# Patient Record
Sex: FEMALE | Race: WHITE | NOT HISPANIC OR LATINO | Employment: PART TIME | URBAN - METROPOLITAN AREA
[De-identification: names, ages, dates, MRNs, and addresses within clinical notes are randomized per-mention and may not be internally consistent; named-entity substitution may affect disease eponyms.]

---

## 2017-12-30 PROBLEM — M16.11 PRIMARY OSTEOARTHRITIS OF RIGHT HIP: Status: ACTIVE | Noted: 2017-12-30

## 2018-04-03 PROBLEM — M16.11 PRIMARY OSTEOARTHRITIS OF RIGHT HIP: Status: RESOLVED | Noted: 2017-12-30 | Resolved: 2018-04-03

## 2023-08-22 PROBLEM — S82.032A CLOSED DISPLACED TRANSVERSE FRACTURE OF LEFT PATELLA: Status: ACTIVE | Noted: 2023-08-22

## 2023-10-16 PROBLEM — T84.84XA PAINFUL ORTHOPAEDIC HARDWARE: Status: ACTIVE | Noted: 2023-10-16

## 2024-04-08 ENCOUNTER — OFFICE VISIT (OUTPATIENT)
Dept: ORTHOPEDICS | Facility: CLINIC | Age: 64
End: 2024-04-08
Payer: COMMERCIAL

## 2024-04-08 ENCOUNTER — TELEPHONE (OUTPATIENT)
Dept: ORTHOPEDICS | Facility: CLINIC | Age: 64
End: 2024-04-08

## 2024-04-08 VITALS — HEIGHT: 62 IN | BODY MASS INDEX: 29.25 KG/M2 | WEIGHT: 158.94 LBS

## 2024-04-08 DIAGNOSIS — L98.9 SKIN LESION OF HAND: Primary | ICD-10-CM

## 2024-04-08 PROCEDURE — 3008F BODY MASS INDEX DOCD: CPT | Mod: CPTII,S$GLB,, | Performed by: ORTHOPAEDIC SURGERY

## 2024-04-08 PROCEDURE — 99999 PR PBB SHADOW E&M-EST. PATIENT-LVL III: CPT | Mod: PBBFAC,,, | Performed by: ORTHOPAEDIC SURGERY

## 2024-04-08 PROCEDURE — 99204 OFFICE O/P NEW MOD 45 MIN: CPT | Mod: S$GLB,,, | Performed by: ORTHOPAEDIC SURGERY

## 2024-04-08 PROCEDURE — 1159F MED LIST DOCD IN RCRD: CPT | Mod: CPTII,S$GLB,, | Performed by: ORTHOPAEDIC SURGERY

## 2024-04-08 NOTE — PROGRESS NOTES
"  2024    Chief Complaint:  No chief complaint on file.      HPI:  Zahira Méndez is a 64 y.o. female, who presents to clinic today has a history of mass over the dorsum of her right.  It has only been there for a short time.  It is getting bigger in his causing soreness.  She is here today for evaluation and treatment.      PMHX:  Past Medical History:   Diagnosis Date    1 PPD X 10+ YR Tobacco Abuse 2018    Attention Deficit Disorder 2018    Depression 2018    Family H/O CAD a    "SEs q5Yr;" Her Father And Brother    General Anesthetics Causing Adverse Effect 2018    wakes up very slowly    H/O 2 Jaw Surgeries 2018    H/O Right THR In 2018    Dr. Jesus Astorga    Hypercholesterolemia 2018    She Was RXd Crestor 10 Mg qHS In The Past    Lumbar Spinal DDD 2018 L-Spine MRI W/O Contrast = (Report In CPS)    Mild Hypertriglyceridemia 2018    PONV (postoperative nausea and vomiting)     Strong Family H/O DM 2018    Therapeutic Drug Monitoring 2018       PSHX:  Past Surgical History:   Procedure Laterality Date     SECTION      x 2    HYSTERECTOMY      JOINT REPLACEMENT Right 2017    GRUPO    MANDIBLE OSTEOTOMY      OPEN REDUCTION AND INTERNAL FIXATION (ORIF) OF FRACTURE OF PATELLA Left 2023    Procedure: ORIF, FRACTURE, PATELLA;  Surgeon: Jesus Astorga MD;  Location: [facility] OR;  Service: Orthopedics;  Laterality: Left;    REMOVAL OF HARDWARE FROM LOWER EXTREMITY Left 10/16/2023    Procedure: REMOVAL, HARDWARE, LOWER EXTREMITY- patella;  Surgeon: Jesus Astorga MD;  Location: [facility] OR;  Service: Orthopedics;  Laterality: Left;    TEMPOROMANDIBULAR JOINT SURGERY      TONSILLECTOMY         FMHX:  Family History   Problem Relation Age of Onset    Arthritis Mother     Stroke Mother     Heart disease Father     No Known Problems Sister     Heart disease Brother        SOCHX:  Social History     Tobacco Use    Smoking " "status: Every Day     Current packs/day: 1.00     Average packs/day: 1 pack/day for 10.0 years (10.0 ttl pk-yrs)     Types: Cigarettes    Smokeless tobacco: Never   Substance Use Topics    Alcohol use: No       ALLERGIES:  Morphine and Cyclobenzaprine    CURRENT MEDICATIONS:  Current Outpatient Medications on File Prior to Visit   Medication Sig Dispense Refill    cholecalciferol, vitamin D3, 1,250 mcg (50,000 unit) capsule Take 50,000 Units by mouth every 7 days.      dextroamphetamine-amphetamine (ADDERALL XR) 20 MG 24 hr capsule Take 20 mg by mouth 2 (two) times daily.       escitalopram oxalate (LEXAPRO) 20 MG tablet Take 20 mg by mouth every evening.      buPROPion (WELLBUTRIN SR) 100 MG TBSR 12 hr tablet  (Patient not taking: Reported on 4/8/2024)       No current facility-administered medications on file prior to visit.       REVIEW OF SYSTEMS:  Review of Systems   Constitutional: Negative.    HENT: Negative.     Eyes: Negative.    Respiratory: Negative.     Cardiovascular: Negative.    Gastrointestinal: Negative.    Genitourinary: Negative.    Musculoskeletal:  Positive for joint pain.   Skin: Negative.    Neurological: Negative.    Endo/Heme/Allergies: Negative.    Psychiatric/Behavioral: Negative.       GENERAL PHYSICAL EXAM:   Ht 5' 2" (1.575 m)   Wt 72.1 kg (158 lb 15.2 oz)   BMI 29.07 kg/m²    GEN: well developed, well nourished, no acute distress   HENT: Normocephalic, atraumatic   EYES: No discharge, conjunctiva normal   NECK: Supple, non-tender   PULM: No wheezing, no respiratory distress   CV: RRR   ABD: Soft, non-tender    ORTHO EXAM:   Examination of the right hand reveals that there is a mass which measures 0.5 x 1 cm in size overlying the dorsum of the hand.  This is a cutaneous type mass.  There was a small heaped up margin.  There was a small central ulceration.  This mass is mobile with the skin.  It was not tethered to the underlying tissues.  It is mildly tender to palpation.  There is no " surrounding erythema or drainage.  He is able to flex and extend the fingers.  He was grossly neurovascularly intact.    RADIOLOGY:   None    ASSESSMENT:   Right hand cutaneous mass    PLAN:  1. Will send the patient to dermatology for possible biopsy of this lesion     2. If this does appear to be something that needs resection or excision be happy to perform that procedure for her.

## 2024-04-08 NOTE — TELEPHONE ENCOUNTER
----- Message from Abel Yao MA sent at 4/8/2024  2:24 PM CDT -----  Contact: patient  Patient was seen earlier today & Dr. Armstrong recommended she see a dermatologist.  Cannot get in until June.  Patient stated she was told her second option was for Dr. Armstrong to do it?    Call back number is [phone]

## 2024-04-08 NOTE — TELEPHONE ENCOUNTER
Called and spoke with patient regarding her going to Dermatology.  I stated that our supervisor is working on getting her a sooner appointment and they will call her asap.

## 2024-04-11 ENCOUNTER — OFFICE VISIT (OUTPATIENT)
Dept: DERMATOLOGY | Facility: CLINIC | Age: 64
End: 2024-04-11
Payer: COMMERCIAL

## 2024-04-11 DIAGNOSIS — D48.5 NEOPLASM OF UNCERTAIN BEHAVIOR OF SKIN: Primary | ICD-10-CM

## 2024-04-11 PROCEDURE — 88312 SPECIAL STAINS GROUP 1: CPT | Mod: PO | Performed by: DERMATOLOGY

## 2024-04-11 PROCEDURE — 99999 PR PBB SHADOW E&M-EST. PATIENT-LVL II: CPT | Mod: PBBFAC,,, | Performed by: DERMATOLOGY

## 2024-04-11 PROCEDURE — 88312 SPECIAL STAINS GROUP 1: CPT | Mod: 26,,, | Performed by: DERMATOLOGY

## 2024-04-11 PROCEDURE — 88305 TISSUE EXAM BY PATHOLOGIST: CPT | Mod: PO | Performed by: DERMATOLOGY

## 2024-04-11 PROCEDURE — 99499 UNLISTED E&M SERVICE: CPT | Mod: S$GLB,,, | Performed by: DERMATOLOGY

## 2024-04-11 PROCEDURE — 11102 TANGNTL BX SKIN SINGLE LES: CPT | Mod: S$GLB,,, | Performed by: DERMATOLOGY

## 2024-04-11 PROCEDURE — 88305 TISSUE EXAM BY PATHOLOGIST: CPT | Mod: 26,,, | Performed by: DERMATOLOGY

## 2024-04-11 NOTE — PROGRESS NOTES
Subjective:      Patient ID:  Zahira Méndez is a 64 y.o. female who presents for No chief complaint on file.    HPI  New Patient  Here for growing lesion on R hand x 3 months , per referral. Enlarging. Early after onset, turkey pecked /traumatized lesion. +Painful     No phx or fhx of skin cancer    Review of Systems    Objective:   Physical Exam   Constitutional: She appears well-developed and well-nourished. No distress.   Neurological: She is alert and oriented to person, place, and time. She is not disoriented.   Psychiatric: She has a normal mood and affect.   Skin:              Diagram Legend     Erythematous scaling macule/papule c/w actinic keratosis       Vascular papule c/w angioma      Pigmented verrucoid papule/plaque c/w seborrheic keratosis      Yellow umbilicated papule c/w sebaceous hyperplasia      Irregularly shaped tan macule c/w lentigo     1-2 mm smooth white papules consistent with Milia      Movable subcutaneous cyst with punctum c/w epidermal inclusion cyst      Subcutaneous movable cyst c/w pilar cyst      Firm pink to brown papule c/w dermatofibroma      Pedunculated fleshy papule(s) c/w skin tag(s)      Evenly pigmented macule c/w junctional nevus     Mildly variegated pigmented, slightly irregular-bordered macule c/w mildly atypical nevus      Flesh colored to evenly pigmented papule c/w intradermal nevus       Pink pearly papule/plaque c/w basal cell carcinoma      Erythematous hyperkeratotic cursted plaque c/w SCC      Surgical scar with no sign of skin cancer recurrence      Open and closed comedones      Inflammatory papules and pustules      Verrucoid papule consistent consistent with wart     Erythematous eczematous patches and plaques     Dystrophic onycholytic nail with subungual debris c/w onychomycosis     Umbilicated papule    Erythematous-base heme-crusted tan verrucoid plaque consistent with inflamed seborrheic keratosis     Erythematous Silvery Scaling Plaque c/w  Psoriasis     See annotation        Assessment / Plan:      Pathology Orders:       Normal Orders This Visit    Specimen to Pathology, Dermatology     Comments:    Number of Specimens:->1  Spec 1 Procedure:->Biopsy  Spec 1 Clinical Impression:->r/o scc r/o infectious  Spec 1 Source:->right dorsal hand  Release to patient->Immediate    Questions:    Procedure Type: Dermatology and skin neoplasms    Number of Specimens: 1    ------------------------:     Spec 1 Procedure: Biopsy    Spec 1 Clinical Impression: r/o scc r/o infectious    Spec 1 Source: right dorsal hand    Release to patient: Immediate          Neoplasm of uncertain behavior of skin  -     Ambulatory referral/consult to Dermatology  -     Specimen to Pathology, Dermatology    - Discussed diagnosis with patient and explained uncertain nature of condition, including differential DDX.   - Discussed treatment options (biopsy, close monitoring) with patient, including the risks and benefits of each. Patient opted to pursue biopsy.  - Shave Biopsy Procedure Note: Discussed procedure with patient/patient's guardian including risks and benefits as well as treatment alternatives. Risks of procedure include pain, bleeding, infection, post-inflammatory pigmentary alteration, scar, recurrence. Patient informed that the purpose of a biopsy is sampling of condition in question rather than removal in entirety; further treatment may be necessary. Verbal consent obtained. Area to be biopsied marked and cleansed with alcohol. Local anesthesia achieved by injecting approximately 1 cc of 1% lidocaine with epinephrine. One shave biopsy performed using a double edge razor blade; specimen submitted to pathology. Hemostasis achieved with aluminum chloride. Petroleum jelly and bandage applied to wound. Patient tolerated procedure well. After-visit wound care instructions reviewed and provided in writing.            Follow up for pending pathology.

## 2024-04-11 NOTE — PATIENT INSTRUCTIONS
Shave Biopsy Wound Care    Your doctor has performed a shave biopsy today.  A band aid and vaseline ointment has been placed over the site.  This should remain in place for NO LONGER THAN 48 hours.  It is fine to remove the bandaid after 24 hours, if the area is no longer bleeding. It is recommended that you keep the area dry (do not wet)) for the first 24 hours.  After 24 hours, wash the area with warm soap and water and apply Vaseline jelly.  Many patients prefer to use Neosporin or Bacitracin ointment.  This is acceptable; however, know that you can develop an allergy to this medication even if you have used it safely for years.  It is important to keep the area moist.  Letting it dry out and get air slows healing time, and will worsen the scar.        If you notice increasing redness, tenderness, pain, or yellow drainage at the biopsy site, please notify your doctor.  These are signs of an infection.    If your biopsy site is bleeding, apply firm pressure for 15 minutes straight.  Repeat for another 15 minutes, if it is still bleeding.   If the surgical site continues to bleed, then please contact your doctor.      For [facility] users:   You will receive your biopsy results in [facility] as soon as they are available. Please be assured that your physician/provider will review your results and will then determine what further treatment, evaluation, or planning is required. You should be contacted by your physician's/provider's office within 5 business days of receiving your results; If not, please reach out to directly. This is one more way [facility] is putting you first.     [facility] [place] [address]/ [phone] FAX/ [email or web address]

## 2024-04-17 LAB
FINAL PATHOLOGIC DIAGNOSIS: NORMAL
GROSS: NORMAL
Lab: NORMAL
MICROSCOPIC EXAM: NORMAL

## 2024-04-18 ENCOUNTER — TELEPHONE (OUTPATIENT)
Dept: DERMATOLOGY | Facility: CLINIC | Age: 64
End: 2024-04-18
Payer: COMMERCIAL

## 2024-04-18 NOTE — TELEPHONE ENCOUNTER
Attempted call to Ms Méndez to discuss pathology results.  Voicemail left to return call at her convenience.  Will update as indicated

## 2024-04-18 NOTE — TELEPHONE ENCOUNTER
----- Message from Dennise Trammell MD sent at 4/18/2024  5:21 AM CDT -----  1. Skin, right dorsal hand, shave biopsy:    - CONSISTENT WITH PRURIGO NODULARIS WITH PITYROSPORUM (see comment)   Comment:  The lesion is transected at the biopsy base.  Re-biopsy is recommended if the lesion persists or recurs.     Please call to discuss results / plan / schedule:   Biopsy overall benign, no evidence of cancer at this time. Most consistent with a 's nodule and some yeast (likely incidental). Recommend local wound care to site and strict avoidance of lesional manipulation. Recommend re-evaluation in 1-2 months. Will possibly re-biopsy at that time if lesion recurrent / not healing as expected post biopsy. Thank you.

## 2024-04-18 NOTE — PROGRESS NOTES
1. Skin, right dorsal hand, shave biopsy:    - CONSISTENT WITH PRURIGO NODULARIS WITH PITYROSPORUM (see comment)   Comment:  The lesion is transected at the biopsy base.  Re-biopsy is recommended if the lesion persists or recurs.     Please call to discuss results / plan / schedule:   Biopsy overall benign, no evidence of cancer at this time. Most consistent with a 's nodule and some yeast (likely incidental). Recommend local wound care to site and strict avoidance of lesional manipulation. Recommend re-evaluation in 1-2 months. Will possibly re-biopsy at that time if lesion recurrent / not healing as expected post biopsy. Thank you.

## 2024-04-23 ENCOUNTER — PATIENT MESSAGE (OUTPATIENT)
Dept: DERMATOLOGY | Facility: CLINIC | Age: 64
End: 2024-04-23
Payer: COMMERCIAL

## 2024-10-14 ENCOUNTER — OFFICE VISIT (OUTPATIENT)
Facility: CLINIC | Age: 64
End: 2024-10-14
Payer: COMMERCIAL

## 2024-10-14 DIAGNOSIS — L82.1 SK (SEBORRHEIC KERATOSIS): ICD-10-CM

## 2024-10-14 DIAGNOSIS — Z12.83 SCREENING FOR SKIN CANCER: ICD-10-CM

## 2024-10-14 DIAGNOSIS — D18.01 CHERRY ANGIOMA: ICD-10-CM

## 2024-10-14 DIAGNOSIS — D23.30 BLUE NEVUS OF FACE: ICD-10-CM

## 2024-10-14 DIAGNOSIS — D22.9 MULTIPLE BENIGN NEVI: Primary | ICD-10-CM

## 2024-10-14 DIAGNOSIS — L81.4 LENTIGINES: ICD-10-CM

## 2024-10-14 DIAGNOSIS — L57.0 AK (ACTINIC KERATOSIS): ICD-10-CM

## 2024-10-14 PROCEDURE — 17000 DESTRUCT PREMALG LESION: CPT | Mod: S$GLB,,, | Performed by: DERMATOLOGY

## 2024-10-14 PROCEDURE — 99213 OFFICE O/P EST LOW 20 MIN: CPT | Mod: 25,S$GLB,, | Performed by: DERMATOLOGY

## 2024-10-14 PROCEDURE — 17003 DESTRUCT PREMALG LES 2-14: CPT | Mod: S$GLB,,, | Performed by: DERMATOLOGY

## 2024-10-14 PROCEDURE — 99999 PR PBB SHADOW E&M-EST. PATIENT-LVL II: CPT | Mod: PBBFAC,,, | Performed by: DERMATOLOGY

## 2024-10-14 PROCEDURE — 1159F MED LIST DOCD IN RCRD: CPT | Mod: CPTII,S$GLB,, | Performed by: DERMATOLOGY

## 2024-10-14 RX ORDER — NICOTINE 7MG/24HR
1 PATCH, TRANSDERMAL 24 HOURS TRANSDERMAL
COMMUNITY
Start: 2024-09-10

## 2024-10-14 RX ORDER — IBUPROFEN 200 MG
1 TABLET ORAL
COMMUNITY
Start: 2024-07-30

## 2024-10-14 RX ORDER — DEXTROAMPHETAMINE SACCHARATE, AMPHETAMINE ASPARTATE, DEXTROAMPHETAMINE SULFATE AND AMPHETAMINE SULFATE 5; 5; 5; 5 MG/1; MG/1; MG/1; MG/1
1 TABLET ORAL 2 TIMES DAILY
COMMUNITY
Start: 2024-09-10

## 2024-10-14 NOTE — PROGRESS NOTES
Subjective:      Patient ID:  Zahira Méndez is a 64 y.o. female who presents for   Chief Complaint   Patient presents with    Follow-up     3 month follow up visit      Skin Check     Skin check      HPI  Established patient.  Here today for total body skin exam.   No personal or known family hx skin cancer.      4/2024  1. Skin, right dorsal hand, shave biopsy:    - CONSISTENT WITH PRURIGO NODULARIS WITH PITYROSPORUM (see comment)   Comment:  The lesion is transected at the biopsy base.  Re-biopsy is recommended if the lesion persists or recurs.     Review of Systems    Objective:   Physical Exam   Constitutional: She appears well-developed and well-nourished. No distress.   Neurological: She is alert and oriented to person, place, and time. She is not disoriented.   Psychiatric: She has a normal mood and affect.   Skin:   Areas Examined (abnormalities noted in diagram):   Scalp / Hair Palpated and Inspected  Head / Face Inspection Performed  Neck Inspection Performed  Chest / Axilla Inspection Performed  Abdomen Inspection Performed  Back Inspection Performed  RUE Inspected  LUE Inspection Performed  RLE Inspected  LLE Inspection Performed                 Diagram Legend     Erythematous scaling macule/papule c/w actinic keratosis       Vascular papule c/w angioma      Pigmented verrucoid papule/plaque c/w seborrheic keratosis      Yellow umbilicated papule c/w sebaceous hyperplasia      Irregularly shaped tan macule c/w lentigo     1-2 mm smooth white papules consistent with Milia      Movable subcutaneous cyst with punctum c/w epidermal inclusion cyst      Subcutaneous movable cyst c/w pilar cyst      Firm pink to brown papule c/w dermatofibroma      Pedunculated fleshy papule(s) c/w skin tag(s)      Evenly pigmented macule c/w junctional nevus     Mildly variegated pigmented, slightly irregular-bordered macule c/w mildly atypical nevus      Flesh colored to evenly pigmented papule c/w intradermal nevus        Pink pearly papule/plaque c/w basal cell carcinoma      Erythematous hyperkeratotic cursted plaque c/w SCC      Surgical scar with no sign of skin cancer recurrence      Open and closed comedones      Inflammatory papules and pustules      Verrucoid papule consistent consistent with wart     Erythematous eczematous patches and plaques     Dystrophic onycholytic nail with subungual debris c/w onychomycosis     Umbilicated papule    Erythematous-base heme-crusted tan verrucoid plaque consistent with inflamed seborrheic keratosis     Erythematous Silvery Scaling Plaque c/w Psoriasis     See annotation      Assessment / Plan:        AK (actinic keratosis)  - Discussed diagnosis, etiology, and precancerous nature of condition.   - Cryosurgery Procedure Note: Discussed procedure with patient/patient's guardian including risks and benefits as well as treatment alternatives. Risks of procedure include pain, itching, swelling, redness, blistering, crusting, wound formation, post-inflammatory pigmentary alteration, scar, recurrence. Verbal consent obtained. LN2 cryosurgery performed to 6 lesion(s). Patient tolerated procedure well. After-visit wound care instructions reviewed and provided in writing.      Blue nevus of face, favored  Plan to recheck in 3-6 months to ensure no atypical change.     Multiple benign nevi  - Discussed diagnosis, etiology, and benign-nature of condition.  - Reassured; no lesions suspicious for malignancy noted on exam today.   - Recommended routine self examination of skin. Discussed the ABCDEs of melanoma and ugly duckling sign.   - Recommended daily sun protection, including the use of OTC broad-spectrum sunscreen (SPF 30 or greater) and sun-protective clothing.      Lentigines  - Benign; reassured treatment not necessary.   - Recommended daily sun protection, including the use of OTC broad-spectrum sunscreen (SPF 30 or greater) and sun-protective clothing.       SK (seborrheic  keratosis)  Cherry angioma  - Benign; reassured treatment not necessary.      Screening for skin cancer  - Total body skin examination performed today.  - Findings listed above.   - Recommended routine self examination of skin.    - Recommended daily sun protection, including the use of OTC broad-spectrum sunscreen (SPF 30 or greater) and sun-protective clothing.             Follow up for spot recheck 3-6 months .

## 2024-10-16 NOTE — PATIENT INSTRUCTIONS
CRYOSURGERY      Your doctor has used a method called cryosurgery to treat your skin condition. Cryosurgery refers to the use of very cold substances to treat a variety of skin conditions such as warts, pre-skin cancers, molluscum contagiosum, sun spots, and several benign growths. The substance we use in cryosurgery is liquid nitrogen and is so cold (-195 degrees Celsius) that is burns when administered.     Following treatment in the office, the skin may immediately burn and become red. You may find the area around the lesion is affected as well. It is sometimes necessary to treat not only the lesion, but a small area of the surrounding normal skin to achieve a good response.     A blister, and even a blood filled blister, may form after treatment.   This is a normal response. If the blister is painful, it is acceptable to sterilize a needle and with rubbing alcohol and gently pop the blister. It is important that you gently wash the area with soap and warm water as the blister fluid may contain wart virus if a wart was treated. Do no remove the roof of the blister.     The area treated can take anywhere from 1-3 weeks to heal. Healing time depends on the kind skin lesion treated, the location, and how aggressively the lesion was treated. It is recommended that the areas treated are covered with Vaseline or bacitracin ointment and a band-aid. If a band-aid is not practical, just ointment applied several times per day will do. Keeping these areas moist will speed the healing time.    Treatment with liquid nitrogen can leave a scar. In dark skin, it may be a light or dark scar, in light skin it may be a white or pink scar. These will generally fade with time, but may never go away completely.     If you have any concerns after your treatment, please feel free to call the office.       [address] [place] [address]/ [phone] FAX/ [email or web address] What Are the Symptoms of Skin  Cancer?  A change in your skin is the most common sign of skin cancer. This could be a new growth, a sore that doesnt heal, or a change in a mole. Not all skin cancers look the same.    For melanoma specifically, a simple way to remember the warning signs is to remember the A-B-C-D-Es of melanoma--    A stands for asymmetrical. Does the mole or spot have an irregular shape with two parts that look very different?  B stands for border. Is the border irregular or jagged?  C is for color. Is the color uneven?  D is for diameter. Is the mole or spot larger than the size of a pea?  E is for evolving. Has the mole or spot changed during the past few weeks or months?    Talk to your doctor if you notice changes in your skin such as a new growth, a sore that doesnt heal, a change in an old growth, or any of the A-B-C-D-Es of melanoma    What Can I Do to Reduce My Risk of Skin Cancer?  Protection from ultraviolet (UV) radiation is important all year, not just during the summer or at the beach. UV rays from the sun can reach you on cloudy and hazy days, not just on bright and danis days. UV rays also reflect off of surfaces like water, cement, sand, and snow. Indoor tanning (using a tanning bed, coffey, or sunlamp to get tan) exposes users to UV radiation.    The hours between 10 a.m. and 4 p.m. Daylight Saving Time (9 a.m. to 3 p.m. standard time) are the most hazardous for UV exposure outdoors in the continental [place]. UV rays from sunlight are the greatest during the late spring and early summer in [place].    CDC recommends easy options for protection from UV radiation--    Stay in the shade or indoors, especially during midday hours.  Wear clothing that covers your arms and legs.  Wear a hat with a wide brim to shade your face, head, ears, and neck.  Wear sunglasses that wrap around and block both UVA and UVB rays.  Use sunscreen with a sun protection factor (SPF) of 30 or higher, and both UVA  and UVB (broad spectrum) protection.  Avoid indoor tanning.    Adapted from https://www.cdc.gov/cancer/skin/basic_info/